# Patient Record
Sex: MALE | Race: BLACK OR AFRICAN AMERICAN
[De-identification: names, ages, dates, MRNs, and addresses within clinical notes are randomized per-mention and may not be internally consistent; named-entity substitution may affect disease eponyms.]

---

## 2017-01-30 ENCOUNTER — HOSPITAL ENCOUNTER (OUTPATIENT)
Dept: HOSPITAL 62 - OD | Age: 82
End: 2017-01-30
Attending: UROLOGY
Payer: MEDICARE

## 2017-01-30 DIAGNOSIS — N40.0: Primary | ICD-10-CM

## 2017-01-30 PROCEDURE — 84153 ASSAY OF PSA TOTAL: CPT

## 2017-01-30 PROCEDURE — 36415 COLL VENOUS BLD VENIPUNCTURE: CPT

## 2018-07-11 ENCOUNTER — HOSPITAL ENCOUNTER (OUTPATIENT)
Dept: HOSPITAL 62 - OD | Age: 83
End: 2018-07-11
Attending: FAMILY MEDICINE
Payer: MEDICARE

## 2018-07-11 DIAGNOSIS — R05: ICD-10-CM

## 2018-07-11 DIAGNOSIS — I77.819: Primary | ICD-10-CM

## 2018-07-11 PROCEDURE — 71046 X-RAY EXAM CHEST 2 VIEWS: CPT

## 2018-07-11 NOTE — RADIOLOGY REPORT (SQ)
EXAM DESCRIPTION:  CHEST PA/LATERAL



COMPLETED DATE/TIME:  7/11/2018 3:48 pm



REASON FOR STUDY:  COUGH



COMPARISON:  4/4/2009



EXAM PARAMETERS:  NUMBER OF VIEWS: two views

TECHNIQUE: Digital Frontal and Lateral radiographic views of the chest acquired.

RADIATION DOSE: NA

LIMITATIONS: none



FINDINGS:  LUNGS AND PLEURA: The lungs are hyperexpanded.  There is no infiltrate or effusion.  There
 is no mass.

MEDIASTINUM AND HILAR STRUCTURES: No masses or contour abnormalities.

HEART AND VASCULAR STRUCTURES: Heart size is normal.  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

BONES: No acute findings.

HARDWARE: None in the chest.

OTHER: No other significant finding.



IMPRESSION:  Chronic lung changes with no acute cardiopulmonary disease.  There is ectasia of the asc
ending aorta.



TECHNICAL DOCUMENTATION:  JOB ID:  [id number]

 2011 [employer]- All Rights Reserved



Reading location - IP/workstation name: [email or web address]

## 2018-08-13 ENCOUNTER — HOSPITAL ENCOUNTER (OUTPATIENT)
Dept: HOSPITAL 62 - RAD | Age: 83
End: 2018-08-13
Attending: UROLOGY
Payer: MEDICARE

## 2018-08-13 DIAGNOSIS — R31.0: ICD-10-CM

## 2018-08-13 DIAGNOSIS — R32: ICD-10-CM

## 2018-08-13 DIAGNOSIS — N40.0: Primary | ICD-10-CM

## 2018-08-13 PROCEDURE — 82565 ASSAY OF CREATININE: CPT

## 2018-08-13 PROCEDURE — 74178 CT ABD&PLV WO CNTR FLWD CNTR: CPT

## 2018-08-13 NOTE — RADIOLOGY REPORT (SQ)
EXAM DESCRIPTION:  CT ABD/PELVIS COMBO



COMPLETED DATE/TIME:  8/13/2018 9:42 am



REASON FOR STUDY:  GROSS HEMATURIA (R31.0), BPH (N40.0), URINARY INCONTINENCE (R32) R31.0  GROSS HEMA
TURIA N40.0  BENIGN PROSTATIC HYPERPLASIA WITHOUT LOWER URINRY TRAC R32  UNSPECIFIED URINARY INCONTIN
ENCE



COMPARISON:  None.



TECHNIQUE:  CT scan of the abdomen and pelvis performed with and without intravenous contrast, and wi
thout oral contrast. Contrasted imaging performed helical scanning technique and dynamic intravenous 
contrast injection. Images reviewed with lung, soft tissue, and bone windows. Reconstructed coronal a
nd sagittal MPR images reviewed. Delayed images for evaluation of the urinary system also acquired. A
ll images stored on PACS.

All CT scanners at this facility use dose modulation, iterative reconstruction, and/or weight based d
osing when appropriate to reduce radiation dose to as low as reasonably achievable (ALARA).

[facility]: Dose Right  [facility]: CareDose    [facility]: Dose Right    [facility]: Teradose 4D    [facility]: Smart Technologies



CONTRAST TYPE AND DOSE:  contrast/concentration: Isovue 300.00 mg/ml; Total Contrast Delivered: 98.0 
ml; Total Saline Delivered: 72.0 ml



RENAL FUNCTION:  Creatinine- 1.7



RADIATION DOSE:  CT Rad equipment meets quality standard of care and radiation dose reduction techniq
ues were employed. CTDIvol: 9.0 - 9.0 mGy. DLP: 1361 mGy-cm. .



LIMITATIONS:  None.



FINDINGS:  LOWER CHEST:  Mild to moderate pericardial effusion.  Mild atelectasis or scar/fibrosis in
 the lower lobes.

LIVER: Normal size. No masses.  No dilated ducts.  The hepatic and portal veins are patent.

SPLEEN: Normal size. No focal lesions.

PANCREAS: No masses. No significant calcifications. No adjacent inflammation or peripancreatic fluid 
collections. Pancreatic duct not dilated.

GALLBLADDER: No identified stones by CT criteria. No inflammatory changes to suggest cholecystitis.

ADRENAL GLANDS: No significant masses or asymmetry.

RIGHT KIDNEY AND URETER:  Very tiny nonobstructing calculus in the cortex of the midpole of the right
 kidney.  No solid masses.   No significant calcifications.   No hydronephrosis or hydroureter.

LEFT KIDNEY AND URETER: No solid masses.   No significant calcifications.   No hydronephrosis or hydr
oureter.

AORTA AND VESSELS:  Atherosclerotic changes involving the abdominal aorta and branch vessels.  No ane
urysm. No dissection. Renal arteries, SMA, celiac without stenosis.

RETROPERITONEUM: No retroperitoneal adenopathy, hemorrhage or masses.

BOWEL AND PERITONEAL CAVITY:  Post surgical changes with anastomotic sutures in the mid sigmoid colon
.  Constipation.  Colonic diverticulae without evidence of diverticulitis.  No free fluid .

APPENDIX:  Prior appendectomy.

PELVIS:  The prostate gland is enlarged and measures 5.4 cm in diameter.  There is indentation on the
 base of the urinary bladder may be related to median lobe hypertrophy of the prostate gland.  Questi
on of a 2.5 cm soft tissue mass along the left lateral wall of the urinary bladder raising the questi
on of neoplasm, axial images 69--70, series 3. Mild diffuse thickening of the urinary bladder wall.  
No free fluid.

ABDOMINAL WALL:  Prior right inguinal hernia repair.  Recurrent right inguinal hernia contains a port
ion of the urinary bladder and fat.  Left inguinal hernia contains a loop of non-dilated small bowel,
 fat, and small mesenteric vessels.

BONES:  Degenerative changes L4-L5.

OTHER: No other significant finding.



IMPRESSION:  1 Prostate gland enlargement.  There is indentation on the base of the urinary bladder m
ay be related to median lobe hypertrophy of the prostate gland.  Question of a soft tissue mass along
 the left lateral wall of the urinary bladder raising the question of neoplasm.  Mild diffuse thicken
ing of the urinary bladder wall.  Correlation is definitely suggested.

2.  Prior right inguinal hernia repair.  Recurrent right inguinal hernia contains a portion of the ur
inary bladder and fat.  Left inguinal hernia contains fat, non-dilated small bowel and small mesenter
ic vessels.

3 Additional findings as above.

4.  Mild to moderate pericardial effusion.  Correlation suggested.



TECHNICAL DOCUMENTATION:  JOB ID:  [id number]

Quality ID # 436: Final reports with documentation of one or more dose reduction techniques (e.g., Au
tomated exposure control, adjustment of the mA and/or kV according to patient size, use of iterative 
reconstruction technique)

 2011 [employer]- All Rights Reserved



Reading location - IP/workstation name: [email or web address]

## 2018-08-31 ENCOUNTER — HOSPITAL ENCOUNTER (OUTPATIENT)
Dept: HOSPITAL 62 - RAD | Age: 83
End: 2018-08-31
Attending: FAMILY MEDICINE
Payer: MEDICARE

## 2018-08-31 DIAGNOSIS — R05: ICD-10-CM

## 2018-08-31 DIAGNOSIS — J44.9: Primary | ICD-10-CM

## 2018-08-31 PROCEDURE — 71250 CT THORAX DX C-: CPT

## 2018-08-31 NOTE — RADIOLOGY REPORT (SQ)
EXAM DESCRIPTION:  CT CHEST WITHOUT



COMPLETED DATE/TIME:  8/31/2018 2:04 pm



REASON FOR STUDY:  COPD (J44.9), COUGH (R05) J44.9  CHRONIC OBSTRUCTIVE PULMONARY DISEASE, UNSPECIFIE
D R05  COUGH



COMPARISON:  Chest radiograph 7/11/2018



TECHNIQUE:  CT scan performed of the chest without intravenous contrast.  Images reviewed with lung, 
soft tissue and bone windows.  Reconstructed coronal and sagittal MPR images reviewed.  All images st
ored on PACS.

All CT scanners at this facility use dose modulation, iterative reconstruction, and/or weight based d
osing when appropriate to reduce radiation dose to as low as reasonably achievable (ALARA).

[facility]: Dose Right  [facility]: CareDose    [facility]: Dose Right    [facility]: Teradose 4D    [facility]: Smart Technologies



RADIATION DOSE:  CT Rad equipment meets quality standard of care and radiation dose reduction techniq
ues were employed. CTDIvol: 6.0 mGy. DLP: 241 mGy-cm. mGy.



LIMITATIONS:  No technical limitations.



FINDINGS:  LUNGS AND PLEURA: No masses, infiltrates, or pneumothorax.  No pleural effusions or pleura
l calcifications.

HILAR AND MEDIASTINAL STRUCTURES: Upper mediastinal mass versus adenopathy.  5 cm AP diameter image 3
0.  Smaller upper mediastinal nodes.

HEART AND VASCULAR STRUCTURES: No aneurysm.  There is a pericardial effusion.

UPPER ABDOMEN: No significant findings.  Limited exam.

THYROID AND OTHER SOFT TISSUES: No masses.  No adenopathy.

BONES: No significant finding.

HARDWARE: None in the chest.

OTHER: No other significant findings.



IMPRESSION:  Upper mediastinal mass/adenopathy.  Pericardial effusion.



TECHNICAL DOCUMENTATION:  JOB ID:  [id number]

Quality ID # 436: Final reports with documentation of one or more dose reduction techniques (e.g., Au
tomated exposure control, adjustment of the mA and/or kV according to patient size, use of iterative 
reconstruction technique)

 2011 [employer]- All Rights Reserved



Reading location - IP/workstation name: [email or web address]

## 2018-09-25 ENCOUNTER — HOSPITAL ENCOUNTER (OUTPATIENT)
Dept: HOSPITAL 62 - RAD | Age: 83
End: 2018-09-25
Attending: INTERNAL MEDICINE
Payer: MEDICARE

## 2018-09-25 DIAGNOSIS — C38.1: Primary | ICD-10-CM

## 2018-09-25 PROCEDURE — A9552 F18 FDG: HCPCS

## 2018-09-25 PROCEDURE — 78815 PET IMAGE W/CT SKULL-THIGH: CPT

## 2018-09-26 NOTE — RADIOLOGY REPORT (SQ)
EXAM DESCRIPTION:  PET CT SKULL/THIGH



COMPLETED DATE/TIME:  9/25/2018 8:45 pm



REASON FOR STUDY:  MALIGNANT NEOPLASM OF ANTERIOR MEDIASTINUM C38.1  MALIGNANT NEOPLASM OF ANTERIOR M
EDIASTINUM



COMPARISON:  CT chest abdomen pelvis 8/31/2018



RADIONUCLIDE AND DOSE:  10.5 mCi F18 FDG

The route of agent administration: Intravenous



FASTING BLOOD SUGAR:  84 mg/dl



CONTRAST TYPE AND DOSE:  No CT contrast given.



TECHNIQUE:  Blood glucose level was verified.  Above dose of FDG was injected intravenously.  2-D seg
mented attenuation correction images were obtained from the base of the skull to the midthighs.  Nonc
ontrast CT images were obtained for attenuation correction and fusion with emission images.  CT image
s were performed without oral or intravenous contrast and are not sensitive for parenchymal lesions. 
 A series of overlapping emission PET images were obtained.  Images reviewed and manipulated at [facility] work station by the radiologist.  Images stored on PACS.



LIMITATIONS:  None.



FINDINGS:  HEAD AND NECK: No areas of abnormal metabolic activity in the soft tissues of the head and
 neck.

CHEST: A 5.7 x 4.8 cm mass is present in the precarinal region on axial image 89 with SUV 5.4.

Mediastinal adenopathy is present as follows:

Right thoracic inlet 2.4 x 1.7 cm lymph node image 62, SUV 3.4

Pretracheal 2.7 x 2.3 cm lymph node image 76, SUV 2.8

Aortopulmonary window 1.8 x 1.6 cm lymph node image 75, SUV 2.6

ABDOMEN AND PELVIS: No areas of abnormal metabolic activity in the abdomen or pelvis.  Expected physi
ologic activity is present in the genitourinary system and bowel.

PROXIMAL LOWER EXTREMITIES: No areas of abnormal metabolic activity in the soft tissues of the lower 
extremities.

BONES: No abnormal metabolic activity in the visualized skeleton.

ADDITIONAL CT FINDINGS: Trace pericardial effusion, right inguinal hernia containing a portion of the
 right lateral bladder.  Left inguinal hernia with nonobstructed sigmoid colon.  Clips post partial s
igmoid colectomy.  Prostate enlarged.

OTHER: Liver background activity 2.3 SUV.  Blood pool background activity 1.9 SUV



IMPRESSION:  Malignant mediastinal adenopathy as above



TECHNICAL DOCUMENTATION:  JOB ID:  [id number]

 [employer] All Rights Reserved



Reading location - IP/workstation name: [facility]

## 2019-02-13 ENCOUNTER — HOSPITAL ENCOUNTER (OUTPATIENT)
Dept: HOSPITAL 62 - RAD | Age: 84
End: 2019-02-13
Attending: INTERNAL MEDICINE
Payer: MEDICARE

## 2019-02-13 DIAGNOSIS — C7A.090: Primary | ICD-10-CM

## 2019-02-13 PROCEDURE — 71250 CT THORAX DX C-: CPT

## 2019-02-13 NOTE — RADIOLOGY REPORT (SQ)
EXAM DESCRIPTION:  CT CHEST WITHOUT



COMPLETED DATE/TIME:  2/13/2019 9:05 am



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
INOID TUMOR OF THE BRONCHUS AND LUNG



COMPARISON:  8/31/2018



TECHNIQUE:  CT scan performed of the chest without intravenous contrast.  Images reviewed with lung, 
soft tissue and bone windows.  Reconstructed coronal and sagittal MPR images reviewed.  All images st
ored on PACS.

All CT scanners at this facility use dose modulation, iterative reconstruction, and/or weight based d
osing when appropriate to reduce radiation dose to as low as reasonably achievable (ALARA).

[facility]: Dose Right  [facility]: CareDose    [facility]: Dose Right    [facility]: Teradose 4D    [facility]: Smart Technologies



RADIATION DOSE:  CT Rad equipment meets quality standard of care and radiation dose reduction techniq
ues were employed. CTDIvol: 6.9 mGy. DLP: 282 mGy-cm. mGy.



LIMITATIONS:  No technical limitations.



FINDINGS:  LUNGS AND PLEURA: Mild stable bilateral emphysematous changes with subpleural blebs in the
 apices.  No consolidation or effusions.  No suspicious nodules.

HILAR AND MEDIASTINAL STRUCTURES: There is persistent bulky upper mediastinal adenopathy.  The larges
t soft tissue mass is pretracheal in location adjacent to the ascending aorta.  This measures 6.0 x 4
.9 cm.  It is slightly larger than noted on prior study.

HEART AND VASCULAR STRUCTURES: Small pericardial effusion is again noted.

UPPER ABDOMEN: No significant findings.  Limited exam.

THYROID AND OTHER SOFT TISSUES: No masses.  No adenopathy.

BONES: No significant finding.

HARDWARE: None in the chest.

OTHER: No other significant findings.



IMPRESSION:  1. Bulky upper mediastinal adenopathy and pericardial effusion.  The largest soft tissue
 mass has slightly increased in size and now measures 6.0 x 4.9 cm in size.



TECHNICAL DOCUMENTATION:  JOB ID:  [id number]

Quality ID # 436: Final reports with documentation of one or more dose reduction techniques (e.g., Au
tomated exposure control, adjustment of the mA and/or kV according to patient size, use of iterative 
reconstruction technique)

 2011 [employer]- All Rights Reserved



Reading location - IP/workstation name: [email or web address]

## 2019-04-16 ENCOUNTER — HOSPITAL ENCOUNTER (OUTPATIENT)
Dept: HOSPITAL 62 - RAD | Age: 84
End: 2019-04-16
Attending: OTOLARYNGOLOGY
Payer: MEDICARE

## 2019-04-16 DIAGNOSIS — R59.0: Primary | ICD-10-CM

## 2019-04-16 PROCEDURE — 70490 CT SOFT TISSUE NECK W/O DYE: CPT

## 2019-04-16 PROCEDURE — 82565 ASSAY OF CREATININE: CPT

## 2019-04-16 NOTE — RADIOLOGY REPORT (SQ)
EXAM DESCRIPTION:  CT SOFT TISSUE NECK WITHOUT



COMPLETED DATE/TIME:  4/16/2019 9:49 am



REASON FOR STUDY:  CERVICAL LYMPHADENOPATHY R59.0  LOCALIZED ENLARGED LYMPH NODES



COMPARISON:  CT chest dated 2/13/2019.



TECHNIQUE:  Noncontrast scanning from skull base through lung apices with review of bone, soft tissue
 and lung windows.  Reconstructed coronal and sagittal MPR images reviewed.  All images stored on PAC
S.

All CT scanners at this facility use dose modulation, iterative reconstruction, and/or weight based d
osing when appropriate to reduce radiation dose to as low as reasonably achievable (ALARA).

[facility]: Dose Right  [facility]: CareDose    [facility]: Dose Right    [facility]: Teradose 4D    [facility]: Smart Technologies



RADIATION DOSE:   mGy.



LIMITATIONS:  None.



FINDINGS:  SKULL BASE: Intact.

MAJOR SALIVARY GLANDS: No solid or cystic masses.  No inflammatory changes.

LYMPHADENOPATHY: Extensive adenopathy in the upper mediastinum again seen.  There are numerous large 
lymph nodes in the paratracheal region at the level of the aortic arch and origin of the great vessel
s as well as enlarged right supraclavicular lymph nodes.  The largest of the right supraclavicular ly
mph nodes measures 3.6 cm.  There are a few slightly enlarged lymph nodes in the left supraclavicular
 region measuring up to 1.4 cm.  Overall the lymph nodes are unchanged in size and appearance.  No ne
w adenopathy.  No significant cervical adenopathy.

MUCOSAL MASSES OR ASYMMETRY: No mucosal masses or asymmetry.

LARYNX/CORDS: No abnormal findings.

LUNG APICES: Clear.  8 emphysematous changes with small bullae.

BONES: Intact.  Degenerative changes in the spine.

THYROID: Normal size.  No masses.

PARANASAL SINUSES: Clear.

OTHER: No other significant finding.



IMPRESSION:  EXTENSIVE ADENOPATHY IN THE UPPER MEDIASTINUM.  SUPRACLAVICULAR ADENOPATHY, RIGHT GREATE
R THAN LEFT.  COMPARED TO THE PREVIOUS CT CHEST THESE LYMPH NODES APPEAR UNCHANGED IN SIZE AND APPEAR
ANCE.  NO NEW LYMPH NODES EVIDENT.  NO SIGNIFICANT CERVICAL ADENOPATHY.



TECHNICAL DOCUMENTATION:  JOB ID:  [id number]

Quality ID # 436: Final reports with documentation of one or more dose reduction techniques (e.g., Au
tomated exposure control, adjustment of the mA and/or kV according to patient size, use of iterative 
reconstruction technique)

 2011 [employer]- All Rights Reserved



Reading location - IP/workstation name: [email or web address]

## 2019-04-30 ENCOUNTER — HOSPITAL ENCOUNTER (OUTPATIENT)
Dept: HOSPITAL 62 - RAD | Age: 84
End: 2019-04-30
Attending: OTOLARYNGOLOGY
Payer: MEDICARE

## 2019-04-30 DIAGNOSIS — C7B.8: Primary | ICD-10-CM

## 2019-04-30 PROCEDURE — 88341 IMHCHEM/IMCYTCHM EA ADD ANTB: CPT

## 2019-04-30 PROCEDURE — 88313 SPECIAL STAINS GROUP 2: CPT

## 2019-04-30 PROCEDURE — 88342 IMHCHEM/IMCYTCHM 1ST ANTB: CPT

## 2019-04-30 PROCEDURE — 88305 TISSUE EXAM BY PATHOLOGIST: CPT

## 2019-04-30 PROCEDURE — 38505 NEEDLE BIOPSY LYMPH NODES: CPT

## 2019-04-30 NOTE — RADIOLOGY REPORT (SQ)
EXAM DESCRIPTION:  U/S BIOPSY SUPERFIC LYMPH NODE



COMPLETED DATE/TIME:  4/30/2019 2:49 pm



REASON FOR STUDY:  CERVICAL LYMPHADENOPATHY (R59.0) R59.0  LOCALIZED ENLARGED LYMPH NODES



COMPARISON:   None.



TECHNIQUE:  The procedure was discussed with the patient and written informed consent obtained.  A ti
meout was performed to confirm the procedure and patient's identity.  The skin of the neck was preppe
d and draped in sterile fashion and 2 cc 1% lidocaine

administered for local anesthesia.  Under sonographic guidance, 14 cage core biopsy was performed of 
the right supraclavicular left node.

2 cores were obtained.  Pathology was present to verify adequate tissue sampling.  Hemostasis was obt
ained with direct manual compression.  There were no immediate complications.



LIMITATIONS:  None.



FINDINGS:  PATHOLOGY: Pending.



IMPRESSION:  ULTRASOUND-GUIDED BIOPSY RIGHT SUPRACLAVICULAR LYMPH NODE.  PATHOLOGY PENDING AT THE LACHELLE
E OF DICTATION.



COMMENT:  Patient medication list reviewed: Yes- Quality ID# 130:Eligible professional attests to doc
umenting in the medical record they obtained, updated, or reviewed the patient's current medications.




TECHNICAL DOCUMENTATION:  JOB ID:  [id number]

 2011 [employer]- All Rights Reserved



Reading location - IP/workstation name: [email or web address]

## 2019-09-06 ENCOUNTER — HOSPITAL ENCOUNTER (INPATIENT)
Dept: HOSPITAL 62 - ER | Age: 84
LOS: 3 days | Discharge: HOSPICE | DRG: 844 | End: 2019-09-09
Attending: FAMILY MEDICINE | Admitting: FAMILY MEDICINE
Payer: MEDICARE

## 2019-09-06 DIAGNOSIS — C7B.09: Primary | ICD-10-CM

## 2019-09-06 DIAGNOSIS — C7A.090: ICD-10-CM

## 2019-09-06 DIAGNOSIS — K21.9: ICD-10-CM

## 2019-09-06 DIAGNOSIS — Z88.8: ICD-10-CM

## 2019-09-06 DIAGNOSIS — Z87.891: ICD-10-CM

## 2019-09-06 DIAGNOSIS — I10: ICD-10-CM

## 2019-09-06 DIAGNOSIS — E78.5: ICD-10-CM

## 2019-09-06 DIAGNOSIS — Z66: ICD-10-CM

## 2019-09-06 DIAGNOSIS — Z79.899: ICD-10-CM

## 2019-09-06 LAB
ADD MANUAL DIFF: NO
ADD MANUAL DIFF: NO
ALBUMIN SERPL-MCNC: 4.1 G/DL (ref 3.5–5)
ALP SERPL-CCNC: 63 U/L (ref 38–126)
ANION GAP SERPL CALC-SCNC: 9 MMOL/L (ref 5–19)
APPEARANCE UR: (no result)
APTT PPP: (no result) S
AST SERPL-CCNC: 53 U/L (ref 17–59)
BASOPHILS # BLD AUTO: 0 10^3/UL (ref 0–0.2)
BASOPHILS # BLD AUTO: 0.1 10^3/UL (ref 0–0.2)
BASOPHILS NFR BLD AUTO: 0.1 % (ref 0–2)
BASOPHILS NFR BLD AUTO: 0.7 % (ref 0–2)
BILIRUB DIRECT SERPL-MCNC: 0.5 MG/DL (ref 0–0.4)
BILIRUB SERPL-MCNC: 0.9 MG/DL (ref 0.2–1.3)
BILIRUB UR QL STRIP: (no result)
BUN SERPL-MCNC: 16 MG/DL (ref 7–20)
CALCIUM: 10.1 MG/DL (ref 8.4–10.2)
CHLORIDE SERPL-SCNC: 101 MMOL/L (ref 98–107)
CK MB SERPL-MCNC: 4.19 NG/ML (ref ?–4.55)
CK SERPL-CCNC: 331 U/L (ref 55–170)
CO2 SERPL-SCNC: 25 MMOL/L (ref 22–30)
EOSINOPHIL # BLD AUTO: 0 10^3/UL (ref 0–0.6)
EOSINOPHIL # BLD AUTO: 0.1 10^3/UL (ref 0–0.6)
EOSINOPHIL NFR BLD AUTO: 0.1 % (ref 0–6)
EOSINOPHIL NFR BLD AUTO: 1 % (ref 0–6)
ERYTHROCYTE [DISTWIDTH] IN BLOOD BY AUTOMATED COUNT: 14.4 % (ref 11.5–14)
ERYTHROCYTE [DISTWIDTH] IN BLOOD BY AUTOMATED COUNT: 14.6 % (ref 11.5–14)
GLUCOSE SERPL-MCNC: 113 MG/DL (ref 75–110)
GLUCOSE UR STRIP-MCNC: NEGATIVE MG/DL
HCT VFR BLD CALC: 30.9 % (ref 37.9–51)
HCT VFR BLD CALC: 36.5 % (ref 37.9–51)
HGB BLD-MCNC: 10.6 G/DL (ref 13.5–17)
HGB BLD-MCNC: 12.3 G/DL (ref 13.5–17)
INR PPP: 1.1
KETONES UR STRIP-MCNC: 20 MG/DL
LYMPHOCYTES # BLD AUTO: 0.8 10^3/UL (ref 0.5–4.7)
LYMPHOCYTES # BLD AUTO: 1.8 10^3/UL (ref 0.5–4.7)
LYMPHOCYTES NFR BLD AUTO: 17.7 % (ref 13–45)
LYMPHOCYTES NFR BLD AUTO: 9.8 % (ref 13–45)
MCH RBC QN AUTO: 30.5 PG (ref 27–33.4)
MCH RBC QN AUTO: 30.8 PG (ref 27–33.4)
MCHC RBC AUTO-ENTMCNC: 33.8 G/DL (ref 32–36)
MCHC RBC AUTO-ENTMCNC: 34.2 G/DL (ref 32–36)
MCV RBC AUTO: 90 FL (ref 80–97)
MCV RBC AUTO: 90 FL (ref 80–97)
MONOCYTES # BLD AUTO: 0.2 10^3/UL (ref 0.1–1.4)
MONOCYTES # BLD AUTO: 0.9 10^3/UL (ref 0.1–1.4)
MONOCYTES NFR BLD AUTO: 2.4 % (ref 3–13)
MONOCYTES NFR BLD AUTO: 9 % (ref 3–13)
NEUTROPHILS # BLD AUTO: 6.9 10^3/UL (ref 1.7–8.2)
NEUTROPHILS # BLD AUTO: 7.2 10^3/UL (ref 1.7–8.2)
NEUTS SEG NFR BLD AUTO: 71.6 % (ref 42–78)
NEUTS SEG NFR BLD AUTO: 87.6 % (ref 42–78)
NITRITE UR QL STRIP: NEGATIVE
PH UR STRIP: 5 [PH] (ref 5–9)
PLATELET # BLD: 211 10^3/UL (ref 150–450)
PLATELET # BLD: 229 10^3/UL (ref 150–450)
POTASSIUM SERPL-SCNC: 4.7 MMOL/L (ref 3.6–5)
PROT SERPL-MCNC: 8.3 G/DL (ref 6.3–8.2)
PROT UR STRIP-MCNC: 100 MG/DL
PROTHROMBIN TIME: 14.3 SEC (ref 11.4–15.4)
RBC # BLD AUTO: 3.43 10^6/UL (ref 4.35–5.55)
RBC # BLD AUTO: 4.04 10^6/UL (ref 4.35–5.55)
SP GR UR STRIP: 1.02
TOTAL CELLS COUNTED % (AUTO): 100 %
TOTAL CELLS COUNTED % (AUTO): 100 %
TROPONIN I SERPL-MCNC: 0.03 NG/ML
UROBILINOGEN UR-MCNC: 4 MG/DL (ref ?–2)
WBC # BLD AUTO: 10 10^3/UL (ref 4–10.5)
WBC # BLD AUTO: 7.9 10^3/UL (ref 4–10.5)

## 2019-09-06 PROCEDURE — 93010 ELECTROCARDIOGRAM REPORT: CPT

## 2019-09-06 PROCEDURE — 85610 PROTHROMBIN TIME: CPT

## 2019-09-06 PROCEDURE — 71045 X-RAY EXAM CHEST 1 VIEW: CPT

## 2019-09-06 PROCEDURE — 83735 ASSAY OF MAGNESIUM: CPT

## 2019-09-06 PROCEDURE — 96374 THER/PROPH/DIAG INJ IV PUSH: CPT

## 2019-09-06 PROCEDURE — 93005 ELECTROCARDIOGRAM TRACING: CPT

## 2019-09-06 PROCEDURE — 81001 URINALYSIS AUTO W/SCOPE: CPT

## 2019-09-06 PROCEDURE — 82550 ASSAY OF CK (CPK): CPT

## 2019-09-06 PROCEDURE — 36415 COLL VENOUS BLD VENIPUNCTURE: CPT

## 2019-09-06 PROCEDURE — 70450 CT HEAD/BRAIN W/O DYE: CPT

## 2019-09-06 PROCEDURE — 85025 COMPLETE CBC W/AUTO DIFF WBC: CPT

## 2019-09-06 PROCEDURE — 82962 GLUCOSE BLOOD TEST: CPT

## 2019-09-06 PROCEDURE — 80053 COMPREHEN METABOLIC PANEL: CPT

## 2019-09-06 PROCEDURE — 80048 BASIC METABOLIC PNL TOTAL CA: CPT

## 2019-09-06 PROCEDURE — 84484 ASSAY OF TROPONIN QUANT: CPT

## 2019-09-06 PROCEDURE — 99291 CRITICAL CARE FIRST HOUR: CPT

## 2019-09-06 PROCEDURE — 82553 CREATINE MB FRACTION: CPT

## 2019-09-06 RX ADMIN — DEXAMETHASONE SODIUM PHOSPHATE SCH MG: 4 INJECTION, SOLUTION INTRAMUSCULAR; INTRAVENOUS at 21:55

## 2019-09-06 RX ADMIN — FAMOTIDINE SCH: 20 TABLET, FILM COATED ORAL at 21:56

## 2019-09-06 RX ADMIN — INSULIN LISPRO SCH: 100 INJECTION, SOLUTION INTRAVENOUS; SUBCUTANEOUS at 21:43

## 2019-09-06 RX ADMIN — LEVETIRACETAM SCH MLS/HR: 5 INJECTION INTRAVENOUS at 21:55

## 2019-09-06 NOTE — RADIOLOGY REPORT (SQ)
EXAM DESCRIPTION:  CHEST SINGLE VIEW



COMPLETED DATE/TIME:  9/6/2019 3:15 pm



REASON FOR STUDY:  New onset weakness



COMPARISON:  4/4/2009



NUMBER OF VIEWS:  One view.



TECHNIQUE:  Single frontal radiographic view of the chest acquired.



LIMITATIONS:  None.



FINDINGS:  LUNGS AND PLEURA: No opacities, masses or pneumothorax. No pleural effusion.

MEDIASTINUM AND HILAR STRUCTURES: There is right hilar and upper mediastinal adenopathy.

HEART AND VASCULAR STRUCTURES: Heart normal in size.  Normal vasculature.

BONES: No acute findings.

HARDWARE: None in the chest.

OTHER: No other significant finding.



IMPRESSION:  Right hilar and upper mediastinal adenopathy.



TECHNICAL DOCUMENTATION:  JOB ID:  [id number]

 2011 [employer]- All Rights Reserved



Reading location - IP/workstation name: [email or web address]

## 2019-09-06 NOTE — PROGRESS NOTE
Provider Note


Provider Note: 





pt seen and exam in er


d/w wife and family


pt id dnr/dni


plan for hospic care

## 2019-09-06 NOTE — RADIOLOGY REPORT (SQ)
EXAM DESCRIPTION:  CT HEAD WITHOUT



COMPLETED DATE/TIME:  9/6/2019 3:09 pm



REASON FOR STUDY:  New onset weakness



COMPARISON:  None.



TECHNIQUE:  Axial images acquired through the brain without intravenous contrast.  Images reviewed wi
th bone, brain and subdural windows.  Additional sagittal and coronal reconstructions were generated.
 Images stored on PACS.

All CT scanners at this facility use dose modulation, iterative reconstruction, and/or weight based d
osing when appropriate to reduce radiation dose to as low as reasonably achievable (ALARA).

[facility]: Dose Right  [facility]: CareDose    [facility]: Dose Right    [facility]: Teradose 4D    [facility]: Smart [employer]



RADIATION DOSE:  CT Rad equipment meets quality standard of care and radiation dose reduction techniq
ues were employed. CTDIvol: 53.2 mGy. DLP: 1044 mGy-cm.mGy.



LIMITATIONS:  None.



FINDINGS:  VENTRICLES: Prominent.

CEREBRUM: There is an area of increased attenuation the left parietal lobe.  There is surrounding vas
ogenic edema this is suspicious for hemorrhagic metastatic disease.  This measures 1.9 x 1.6 cm in si
ze.  There is diffuse decreased attenuation throughout the periventricular white matter consistent wi
th small vessel disease.

CEREBELLUM: There is a hyperattenuating lesion in the left aspect of the cerebellum.  This measures 2
.5 x 2.2 cm.  Again metastatic lesion is thought to be most likely.  There is surrounding vasogenic e
celeste.  Some mass effect on the 3rd ventricle.

EXTRAAXIAL SPACES: Age-related involutional change.  No fluid collections.  No masses.

ORBITS AND GLOBE: No intra- or extraconal masses.  Normal contour of globe without masses.

CALVARIUM: No fracture.

PARANASAL SINUSES: No fluid or mucosal thickening.

SOFT TISSUES: No mass or hematoma.

OTHER: No other significant finding.



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

Diffuse cerebral atrophy and small-vessel ischemic changes.

EVIDENCE OF ACUTE STROKE: NO.



COMMENT:   Pertinent findings on the imaging study reported as a CRITICAL RESULT to VALENTINA STOREY MD
 at15:17 on 9/6/2019.

Category of Critical Result: Intracranial hemorrhage



TECHNICAL DOCUMENTATION:  JOB ID:  [id number]

Quality ID # 436: Final reports with documentation of one or more dose reduction techniques (e.g., Au
tomated exposure control, adjustment of the mA and/or kV according to patient size, use of iterative 
reconstruction technique)

 2011 [employer]- All Rights Reserved



Reading location - IP/workstation name: [email or web address]

## 2019-09-06 NOTE — ER DOCUMENT REPORT
Entered by PACO MACE SCRIBE  09/06/19 1445 





Acting as scribe for:VALENTINA STOREY MD





ED General





- General


Chief Complaint: General Weakness


Stated Complaint: WEAKNESS


Time Seen by Provider: 09/06/19 14:28


Primary Care Provider: 


NORBERTO MORELAND MD [ACTIVE STAFF] - Follow up as needed


Mode of Arrival: Ambulatory


Information source: Patient


Notes: 





Patient is an 88 year old male that presents to the emergency department today 

with complaints of generalized weakness for the last week. Wife at bedside 

states that they recently went on vacation to [place], and on 8/31 the patient 

stated he wasn't feeling good, that he was just going to stay at home. Wife 

reports the patient slept all day, went up to go to the bathroom and on the way 

back fell onto the couch. Wife states the patient has been able to walk since 

then due to weakness. Patient has known lung cancer which he elected not to 

treat.


TRAVEL OUTSIDE OF THE [place] IN LAST 30 DAYS: No





- Related Data


Allergies/Adverse Reactions: 


                                        





simvastatin [From Zocor] Allergy (Verified 09/06/19 15:40)


   











Past Medical History





- General


Information source: Patient





- Social History


Smoking Status: Former Smoker


Cigarette use (# per day): No


Chew tobacco use (# tins/day): No


Smoking Education Provided: No


Frequency of alcohol use: None


Drug Abuse: None


Lives with: Family


Family History: Reviewed & Not Pertinent





Review of Systems





- Review of Systems


-: Yes ROS unobtainable due to patient's medical condition - given by family at 

bedside


Constitutional: See HPI, Weakness - generalized


EENT: No symptoms reported


Cardiovascular: No symptoms reported


Respiratory: No symptoms reported


Gastrointestinal: No symptoms reported


Genitourinary: No symptoms reported


Male Genitourinary: No symptoms reported


Musculoskeletal: No symptoms reported


Skin: No symptoms reported


Hematologic/Lymphatic: No symptoms reported


Neurological/Psychological: No symptoms reported


-: Yes All other systems reviewed and negative





Physical Exam





- Vital signs


Vitals: 


                                        











Pulse Ox


 


 96 


 


 09/06/19 13:43














- Notes


Notes: 





Physical Exam:


 


General: Patient smiles to name, but stares off into space without answering 

questions.


 


HEENT: Normocephalic. Atraumatic. PERRL. Extraocular movements intact. 

Oropharynx clear. Hearing aid on the left. Symmetrical smile. Dry mucous 

membranes. Tongue is coated.


 


Neck: Supple. Non-tender.


 


Respiratory: No respiratory distress. Clear and equal breath sounds bilaterally.


 


Cardiovascular: Regular rate and rhythm. 


 


Abdominal: Normal Inspection. Non-tender. No distension. Normal Bowel Sounds. 


 


Back: No gross abnormalities. 


 


Extremities:


Upper extremities: RUE and right  strength is normal. LUE and left  

strength are weak.


Lower extremities: RLE weakness, unable to hold leg off the bed. LLE strength is

normal. 


 


Neurological: No facial droop. Tongue protrudes midline. 


 


Skin: Warm. Dry. Normal color.





Course





- Vital Signs


Vital signs: 


                                        











Temp Pulse Resp BP Pulse Ox


 


 98.5 F      23 H  144/85 H  95 


 


 09/06/19 13:44     09/06/19 14:01  09/06/19 14:00  09/06/19 14:01














- Laboratory


Result Diagrams: 


                                 09/06/19 13:50





                                 09/06/19 13:50


Laboratory results interpreted by me: 


                                        











  09/06/19





  13:50


 


RBC  4.04 L


 


Hgb  12.3 L


 


Hct  36.5 L


 


RDW  14.6 H














- Diagnostic Test


Radiology reviewed: Image reviewed, Reports reviewed - CT scan of the head shows

2 hemorrhagic metastases, one at the cerebellar peduncle on the left, and one in

the left parietal region.





- Consults


  ** Dr. Damon


Time consulted: 15:30


Consulted provider: will see as inpatient





Critical Care Note





- Critical Care Note


Total time excluding time spent on procedures (mins): 35





Discharge





- Discharge


Clinical Impression: 


 Metastasis to brain, Weakness, Small cell lung cancer





Carcinoid tumor


Qualifiers:


 Carcinoid tumor malignancy status: unspecified whether malignant Carcinoid 

tumor location: unspecified site Qualified Code(s): D3A.00 - Benign carcinoid 

tumor of unspecified site





Condition: Stable


Disposition: ADMITTED AS INPATIENT


Admitting Provider: Nelson


Unit Admitted: IMCU


Referrals: 


MARK DAMON MD [Primary Care Provider] - Follow up as needed


Scribe Attestation: 





09/06/19 15:11


I personally performed the services described in the documentation, reviewed and

edited the documentation which was dictated to the scribe in my presence, and it

accurately records my words and actions.





I personally performed the services described in the documentation, reviewed and

edited the documentation which was dictated to the scribe in my presence, and it

accurately records my words and actions.

## 2019-09-07 LAB
ADD MANUAL DIFF: NO
ANION GAP SERPL CALC-SCNC: 13 MMOL/L (ref 5–19)
BASOPHILS # BLD AUTO: 0.1 10^3/UL (ref 0–0.2)
BASOPHILS NFR BLD AUTO: 0.6 % (ref 0–2)
BUN SERPL-MCNC: 22 MG/DL (ref 7–20)
CALCIUM: 10.2 MG/DL (ref 8.4–10.2)
CHLORIDE SERPL-SCNC: 101 MMOL/L (ref 98–107)
CO2 SERPL-SCNC: 23 MMOL/L (ref 22–30)
EOSINOPHIL # BLD AUTO: 0 10^3/UL (ref 0–0.6)
EOSINOPHIL NFR BLD AUTO: 0 % (ref 0–6)
ERYTHROCYTE [DISTWIDTH] IN BLOOD BY AUTOMATED COUNT: 14.6 % (ref 11.5–14)
GLUCOSE SERPL-MCNC: 126 MG/DL (ref 75–110)
HCT VFR BLD CALC: 32.8 % (ref 37.9–51)
HGB BLD-MCNC: 11.3 G/DL (ref 13.5–17)
LYMPHOCYTES # BLD AUTO: 1.4 10^3/UL (ref 0.5–4.7)
LYMPHOCYTES NFR BLD AUTO: 18 % (ref 13–45)
MCH RBC QN AUTO: 31.2 PG (ref 27–33.4)
MCHC RBC AUTO-ENTMCNC: 34.4 G/DL (ref 32–36)
MCV RBC AUTO: 91 FL (ref 80–97)
MONOCYTES # BLD AUTO: 0.3 10^3/UL (ref 0.1–1.4)
MONOCYTES NFR BLD AUTO: 3.7 % (ref 3–13)
NEUTROPHILS # BLD AUTO: 6.2 10^3/UL (ref 1.7–8.2)
NEUTS SEG NFR BLD AUTO: 77.7 % (ref 42–78)
PLATELET # BLD: 213 10^3/UL (ref 150–450)
POTASSIUM SERPL-SCNC: 4.9 MMOL/L (ref 3.6–5)
RBC # BLD AUTO: 3.61 10^6/UL (ref 4.35–5.55)
TOTAL CELLS COUNTED % (AUTO): 100 %
WBC # BLD AUTO: 8 10^3/UL (ref 4–10.5)

## 2019-09-07 RX ADMIN — LEVETIRACETAM SCH MLS/HR: 5 INJECTION INTRAVENOUS at 09:09

## 2019-09-07 RX ADMIN — DEXAMETHASONE SODIUM PHOSPHATE SCH MG: 4 INJECTION, SOLUTION INTRAMUSCULAR; INTRAVENOUS at 16:39

## 2019-09-07 RX ADMIN — FAMOTIDINE SCH: 20 TABLET, FILM COATED ORAL at 09:16

## 2019-09-07 RX ADMIN — DEXAMETHASONE SODIUM PHOSPHATE SCH MG: 4 INJECTION, SOLUTION INTRAMUSCULAR; INTRAVENOUS at 09:09

## 2019-09-07 RX ADMIN — FAMOTIDINE SCH: 20 TABLET, FILM COATED ORAL at 22:09

## 2019-09-07 RX ADMIN — INSULIN LISPRO SCH: 100 INJECTION, SOLUTION INTRAVENOUS; SUBCUTANEOUS at 15:48

## 2019-09-07 RX ADMIN — INSULIN LISPRO SCH: 100 INJECTION, SOLUTION INTRAVENOUS; SUBCUTANEOUS at 22:09

## 2019-09-07 RX ADMIN — INSULIN LISPRO SCH: 100 INJECTION, SOLUTION INTRAVENOUS; SUBCUTANEOUS at 09:15

## 2019-09-07 RX ADMIN — DEXAMETHASONE SODIUM PHOSPHATE SCH MG: 4 INJECTION, SOLUTION INTRAMUSCULAR; INTRAVENOUS at 22:08

## 2019-09-07 RX ADMIN — INSULIN LISPRO SCH: 100 INJECTION, SOLUTION INTRAVENOUS; SUBCUTANEOUS at 16:49

## 2019-09-07 RX ADMIN — DEXAMETHASONE SODIUM PHOSPHATE SCH MG: 4 INJECTION, SOLUTION INTRAMUSCULAR; INTRAVENOUS at 03:48

## 2019-09-07 RX ADMIN — LEVETIRACETAM SCH MLS/HR: 5 INJECTION INTRAVENOUS at 22:08

## 2019-09-07 NOTE — PDOC PROGRESS REPORT
Subjective


Progress Note for:: 09/07/19


Subjective:: 





Patient was not able to meaningfully contribute to his health history and status

at the time of my bedside visit. Close family friend reported that patient has 

not been able to attend his monthly men's breakfast meeting due to recent 

deterioration in his health. Patient remain DNI and DNR. Nursing staff reported 

non-sustained run of VT without demonstrable symptoms.


Reason For Visit: 


CARCINOID TUMOR,BRAIN METAST








Physical Exam


Vital Signs: 


                                        











Temp Pulse Resp BP Pulse Ox


 


 97.8 F   68   16   107/57 L  94 


 


 09/07/19 07:45  09/07/19 07:45  09/07/19 07:45  09/07/19 07:45  09/07/19 07:45








                                 Intake & Output











 09/06/19 09/07/19 09/08/19





 06:59 06:59 06:59


 


Intake Total  250 100


 


Balance  250 100


 


Weight  77.1 kg 











General appearance: PRESENT: no acute distress


Head exam: PRESENT: atraumatic, normocephalic


Eye exam: PRESENT: conjunctiva pink.  ABSENT: scleral icterus


Ear exam: PRESENT: normal external ear exam


Mouth exam: PRESENT: dry mucosa


Respiratory exam: PRESENT: decreased breath sounds


Cardiovascular exam: PRESENT: RRR.  ABSENT: diastolic murmur, rubs, systolic 

murmur


Vascular exam: ABSENT: pallor


GI/Abdominal exam: PRESENT: normal bowel sounds, soft.  ABSENT: tenderness


Extremities exam: PRESENT: pedal edema


Musculoskeletal exam: PRESENT: deformity - due to multiple joints involvement 

with arthritis


Neurological exam: PRESENT: altered - with right side neglect, oriented to 

person


Skin exam: PRESENT: dry, warm





Results


Laboratory Results: 


                                        





                                 09/07/19 07:36 





                                 09/07/19 06:18 





                                        











  09/06/19 09/06/19 09/06/19





  13:50 13:50 15:56


 


WBC  10.0  


 


RBC  4.04 L  


 


Hgb  12.3 L  


 


Hct  36.5 L  


 


MCV  90  


 


MCH  30.5  


 


MCHC  33.8  


 


RDW  14.6 H  


 


Plt Count  211  


 


Seg Neutrophils %  71.6  


 


Sodium   Cancelled 


 


Potassium   Cancelled 


 


Chloride   Cancelled 


 


Carbon Dioxide   Cancelled 


 


Anion Gap   Cancelled 


 


BUN   Cancelled 


 


Creatinine   Cancelled 


 


Est GFR ( Amer)   Cancelled 


 


Est GFR (Non-Af Amer)   Cancelled 


 


Glucose   Cancelled 


 


Calcium   Cancelled 


 


Magnesium   


 


Total Bilirubin   Cancelled 


 


AST   Cancelled 


 


Alkaline Phosphatase   Cancelled 


 


Total Protein   Cancelled 


 


Albumin   Cancelled 


 


Urine Color    DARK YELLOW


 


Urine Appearance    SLIGHTLY-CLOUDY


 


Urine pH    5.0


 


Ur Specific Gravity    1.025


 


Urine Protein    100 H


 


Urine Glucose (UA)    NEGATIVE


 


Urine Ketones    20 H


 


Urine Blood    NEGATIVE


 


Urine Nitrite    NEGATIVE


 


Ur Leukocyte Esterase    NEGATIVE


 


Urine WBC (Auto)    1


 


Urine RBC (Auto)    2














  09/06/19 09/06/19 09/07/19





  20:18 20:18 06:18


 


WBC   7.9  Cancelled


 


RBC   3.43 L  Cancelled


 


Hgb   10.6 L  Cancelled


 


Hct   30.9 L  Cancelled


 


MCV   90  Cancelled


 


MCH   30.8  Cancelled


 


MCHC   34.2  Cancelled


 


RDW   14.4 H  Cancelled


 


Plt Count   229  Cancelled


 


Seg Neutrophils %   87.6 H  Cancelled


 


Sodium  135.1 L  


 


Potassium  4.7  


 


Chloride  101  


 


Carbon Dioxide  25  


 


Anion Gap  9  


 


BUN  16  


 


Creatinine  1.14  


 


Est GFR ( Amer)  > 60  


 


Est GFR (Non-Af Amer)   


 


Glucose  113 H  


 


Calcium  10.1  


 


Magnesium  1.9  


 


Total Bilirubin  0.9  


 


AST  53  


 


Alkaline Phosphatase  63  


 


Total Protein  8.3 H  


 


Albumin  4.1  


 


Urine Color   


 


Urine Appearance   


 


Urine pH   


 


Ur Specific Gravity   


 


Urine Protein   


 


Urine Glucose (UA)   


 


Urine Ketones   


 


Urine Blood   


 


Urine Nitrite   


 


Ur Leukocyte Esterase   


 


Urine WBC (Auto)   


 


Urine RBC (Auto)   














  09/07/19 09/07/19





  06:18 07:36


 


WBC   8.0


 


RBC   3.61 L


 


Hgb   11.3 L


 


Hct   32.8 L


 


MCV   91


 


MCH   31.2


 


MCHC   34.4


 


RDW   14.6 H


 


Plt Count   213


 


Seg Neutrophils %   77.7


 


Sodium  137.1 


 


Potassium  4.9 


 


Chloride  101 


 


Carbon Dioxide  23 


 


Anion Gap  13 


 


BUN  22 H 


 


Creatinine  1.40 H 


 


Est GFR ( Amer)  58 L 


 


Est GFR (Non-Af Amer)  


 


Glucose  126 H 


 


Calcium  10.2 


 


Magnesium  


 


Total Bilirubin  


 


AST  


 


Alkaline Phosphatase  


 


Total Protein  


 


Albumin  


 


Urine Color  


 


Urine Appearance  


 


Urine pH  


 


Ur Specific Gravity  


 


Urine Protein  


 


Urine Glucose (UA)  


 


Urine Ketones  


 


Urine Blood  


 


Urine Nitrite  


 


Ur Leukocyte Esterase  


 


Urine WBC (Auto)  


 


Urine RBC (Auto)  








                                        











  09/06/19 09/06/19





  20:18 20:18


 


Creatine Kinase  331 H 


 


CK-MB (CK-2)   4.19


 


Troponin I   0.028











Impressions: 


                                        





Chest X-Ray  09/06/19 14:46


IMPRESSION:  Right hilar and upper mediastinal adenopathy.


 








Head CT  09/06/19 14:46


IMPRESSION:  2 areas of high attenuation as described most consistent with 

hemorrhagic metastases.


Diffuse cerebral atrophy and small-vessel ischemic changes.


EVIDENCE OF ACUTE STROKE: NO.


 














Assessment & Plan





- Diagnosis


(1) Weakness


Is this a current diagnosis for this admission?: Yes   


Plan: 


Continue IV fluid support.








(2) Small cell carcinoma of lung


Is this a current diagnosis for this admission?: Yes   


Plan: 


Maintain on DNI and DNR status. Discussed possibility of in patient facility 

hospice placement for further palliative care.








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


Is this a current diagnosis for this admission?: Yes   


Plan: 


Maintain on DNI and DNR status. Discussed possibility of in patient facility 

hospice placement for further palliative care.








- Time


Time Spent with patient: 25-34 minutes


Medications reviewed and adjusted accordingly: Yes


Anticipated discharge: Hospice


Within: Other





- Inpatient Certification


Based on my medical assessment, after consideration of the patient's 

comorbidities, presenting symptoms, or acuity I expect that the services needed 

warrant INPATIENT care.: Yes


I certify that my determination is in accordance with my understanding of 

Medicare's requirements for reasonable and necessary INPATIENT services [42 CFR 

412.3e].: Yes


Medical Necessity: Significant Comorbidiites Make Outpatient Treatment Too 

Risky, Need Close Monitoring Due to Risk of Patient Decompensation, Need For IV 

Fluids, Risk of Complication if Not Cared For in Hospital, Risk of Diagnosis 

Which Will Require Inpatient Eval/Care/Monitoring


Post Hospital Care: D/C Planner Documentation





- Plan Summary


Plan Summary: 


Continue current management with plan for hospice placement.

## 2019-09-07 NOTE — PDOC CONSULTATION
Consultation


Consult Date: 09/07/19


Attending physician:: MARK DAVIS


Provider Consulted: JOHNNIE SHELDON


Consult reason:: Patient with known history of stage IV small cell lung cancer 

here with new brain metastasis





History of Present Illness


Admission Date/PCP: 


  09/06/19 16:11





  MARK DAVIS MD





Patient complains of: Confusion, weakness


History of Present Illness: 


DIANA GALLAGHER is a 88 year old male with previous history noted about 1 year a

go with a lung lesion, biopsy-proven to be low-grade carcinoid, and we were 

treating him with octreotide, about 3 months ago he began having new right neck 

adenopathy, this was biopsied and this indicated high-grade carcinoid favor 

small cell lung cancer, and at that time given his age and comorbidities the 

decision was made to hold on consideration of therapy.  We had offered hospice 

several times over the last couple months but patient was ambulatory and eating 

and did not feel ready for consideration of hospice.  Over the last 2 to 3 days 

prior to admission he was confused, lethargic and wife brought him in.  His wife

is 87 years old, but she has been doing pretty well and is quite ambulatory and 

does all of her ADLs and IADLs by herself.  We see her also in our oncology 

office for anemia.  Upon presentation CT of the head was done and there was a 

1.9 cm lesion in the left parietal lobe with vasogenic edema along with a 2.5 cm

lesion in the cerebellum.  Both consistent with metastatic disease in the brain.








Past Medical History


Cardiac Medical History: Reports: Hyperlipidema, Hypertension


Malignancy Medical History: Reports: Lung Cancer


GI Medical History: Reports: Gastroesophageal Reflux Disease





Past Surgical History


Past Surgical History: Reports: Other - Bronchoscopy with biopsy 2018, biopsy of

right cervical lymph node 2019





Social History


Lives with: Family


Smoking Status: Former Smoker





- Advance Directive


Resuscitation Status: Do Not Resuscitate





Family History


Family History: Reviewed & Not Pertinent


Parental Family History Reviewed: Yes


Children Family History Reviewed: Yes


Sibling(s) Family History Reviewed.: Yes





Medication/Allergy


Home Medications: 








Acetaminophen [Tylenol] 650 mg PO .ASDIR PRN 09/06/19 


Aspirin [Adult Low Dose Aspirin EC] 81 mg PO DAILY 09/06/19 


Benzonatate [Tessalon Perles 100 mg Capsule] 200 mg PO Q8HP PRN 09/06/19 


Cyanocobalamin (Vitamin B-12) [Vitamin B-12] 1,000 mcg PO DAILY 09/06/19 


Doxazosin Mesylate [Cardura] 8 mg PO DAILY 09/06/19 


Finasteride [Proscar 5 mg Tablet] 5 mg PO DAILY 09/06/19 


Hydrocodone/Acetaminophen [Norco 5-325 mg Tablet] 1 tab PO Q4HP PRN 09/06/19 


Pravastatin Sodium [Pravachol] 40 mg PO DAILY 09/06/19 


Ranitidine HCl [Zantac] 150 mg PO BID 09/06/19 


Sodium Bicarbonate [Sodium Bicarbonate 650 mg Tablet] 650 mg PO DAILY 09/06/19 








Allergies/Adverse Reactions: 


                                        





simvastatin [From Zocor] Allergy (Verified 09/06/19 15:40)


   











Review of Systems


ROS unobtainable: Due to mental status





Physical Exam


Vital Signs: 


                                        











Temp Pulse Resp BP Pulse Ox


 


 97.8 F   68   16   107/57 L  94 


 


 09/07/19 07:45  09/07/19 07:45  09/07/19 07:45  09/07/19 07:45  09/07/19 07:45








                                 Intake & Output











 09/06/19 09/07/19 09/08/19





 06:59 06:59 06:59


 


Intake Total  250 100


 


Balance  250 100


 


Weight  77.1 kg 











General appearance: PRESENT: hard of hearing


Mouth exam: PRESENT: dry mucosa


Neck exam: ABSENT: carotid bruit, JVD, lymphadenopathy, thyromegaly


Respiratory exam: PRESENT: clear to auscultation itzel.  ABSENT: rales, rhonchi, 

wheezes


Cardiovascular exam: PRESENT: RRR.  ABSENT: diastolic murmur, rubs, systolic 

murmur


GI/Abdominal exam: PRESENT: normal bowel sounds, soft.  ABSENT: distended, 

guarding, mass, organolmegaly, rebound, tenderness


Rectal exam: PRESENT: deferred


Neurological exam: PRESENT: altered





Results


Laboratory Results: 


                                        





                                 09/07/19 07:36 





                                 09/07/19 06:18 





                                        











  09/06/19 09/06/19 09/06/19





  13:50 13:50 15:56


 


WBC  10.0  


 


RBC  4.04 L  


 


Hgb  12.3 L  


 


Hct  36.5 L  


 


MCV  90  


 


MCH  30.5  


 


MCHC  33.8  


 


RDW  14.6 H  


 


Plt Count  211  


 


Seg Neutrophils %  71.6  


 


Sodium   Cancelled 


 


Potassium   Cancelled 


 


Chloride   Cancelled 


 


Carbon Dioxide   Cancelled 


 


Anion Gap   Cancelled 


 


BUN   Cancelled 


 


Creatinine   Cancelled 


 


Est GFR ( Amer)   Cancelled 


 


Est GFR (Non-Af Amer)   Cancelled 


 


Glucose   Cancelled 


 


Calcium   Cancelled 


 


Magnesium   


 


Total Bilirubin   Cancelled 


 


AST   Cancelled 


 


Alkaline Phosphatase   Cancelled 


 


Total Protein   Cancelled 


 


Albumin   Cancelled 


 


Urine Color    DARK YELLOW


 


Urine Appearance    SLIGHTLY-CLOUDY


 


Urine pH    5.0


 


Ur Specific Gravity    1.025


 


Urine Protein    100 H


 


Urine Glucose (UA)    NEGATIVE


 


Urine Ketones    20 H


 


Urine Blood    NEGATIVE


 


Urine Nitrite    NEGATIVE


 


Ur Leukocyte Esterase    NEGATIVE


 


Urine WBC (Auto)    1


 


Urine RBC (Auto)    2














  09/06/19 09/06/19 09/07/19





  20:18 20:18 06:18


 


WBC   7.9  Cancelled


 


RBC   3.43 L  Cancelled


 


Hgb   10.6 L  Cancelled


 


Hct   30.9 L  Cancelled


 


MCV   90  Cancelled


 


MCH   30.8  Cancelled


 


MCHC   34.2  Cancelled


 


RDW   14.4 H  Cancelled


 


Plt Count   229  Cancelled


 


Seg Neutrophils %   87.6 H  Cancelled


 


Sodium  135.1 L  


 


Potassium  4.7  


 


Chloride  101  


 


Carbon Dioxide  25  


 


Anion Gap  9  


 


BUN  16  


 


Creatinine  1.14  


 


Est GFR ( Amer)  > 60  


 


Est GFR (Non-Af Amer)   


 


Glucose  113 H  


 


Calcium  10.1  


 


Magnesium  1.9  


 


Total Bilirubin  0.9  


 


AST  53  


 


Alkaline Phosphatase  63  


 


Total Protein  8.3 H  


 


Albumin  4.1  


 


Urine Color   


 


Urine Appearance   


 


Urine pH   


 


Ur Specific Gravity   


 


Urine Protein   


 


Urine Glucose (UA)   


 


Urine Ketones   


 


Urine Blood   


 


Urine Nitrite   


 


Ur Leukocyte Esterase   


 


Urine WBC (Auto)   


 


Urine RBC (Auto)   














  09/07/19 09/07/19





  06:18 07:36


 


WBC   8.0


 


RBC   3.61 L


 


Hgb   11.3 L


 


Hct   32.8 L


 


MCV   91


 


MCH   31.2


 


MCHC   34.4


 


RDW   14.6 H


 


Plt Count   213


 


Seg Neutrophils %   77.7


 


Sodium  137.1 


 


Potassium  4.9 


 


Chloride  101 


 


Carbon Dioxide  23 


 


Anion Gap  13 


 


BUN  22 H 


 


Creatinine  1.40 H 


 


Est GFR ( Amer)  58 L 


 


Est GFR (Non-Af Amer)  


 


Glucose  126 H 


 


Calcium  10.2 


 


Magnesium  


 


Total Bilirubin  


 


AST  


 


Alkaline Phosphatase  


 


Total Protein  


 


Albumin  


 


Urine Color  


 


Urine Appearance  


 


Urine pH  


 


Ur Specific Gravity  


 


Urine Protein  


 


Urine Glucose (UA)  


 


Urine Ketones  


 


Urine Blood  


 


Urine Nitrite  


 


Ur Leukocyte Esterase  


 


Urine WBC (Auto)  


 


Urine RBC (Auto)  








                                        











  09/06/19 09/06/19





  20:18 20:18


 


Creatine Kinase  331 H 


 


CK-MB (CK-2)   4.19


 


Troponin I   0.028











Impressions: 


                                        





Chest X-Ray  09/06/19 14:46


IMPRESSION:  Right hilar and upper mediastinal adenopathy.


 








Head CT  09/06/19 14:46


IMPRESSION:  2 areas of high attenuation as described most consistent with 

hemorrhagic metastases.


Diffuse cerebral atrophy and small-vessel ischemic changes.


EVIDENCE OF ACUTE STROKE: NO.


 











Status: Image reviewed by me





Assessment & Plan





- Diagnosis


(1) Small cell carcinoma of lung


Is this a current diagnosis for this admission?: Yes   


Plan: 


Unfortunately with brain metastasis, not candidate for therapy.  I contacted 

Lucie from [facility] hospice who will be talking with his wife, Niki, to see

if home hospice is possible. 








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


Is this a current diagnosis for this admission?: Yes   


Plan: 


Continue with steroids for now, I do not believe patient is a candidate for even

whole brain radiation palliatively, would recommend hospice and I discussed this

with his wife who is agreeable for this.  We will have hospice meet with them 

today or tomorrow and then hopefully set up discharge if possible by Monday.








- Time


Time Spent: Greater than 70 Minutes





- Inpatient Certification


Based on my medical assessment, after consideration of the patient's 

comorbidities, presenting symptoms, or acuity I expect that the services needed 

warrant INPATIENT care.: Yes


Medical Necessity: Need for Neurological Checks, Risk of Complication if Not 

Cared For in Hospital

## 2019-09-08 LAB
ADD MANUAL DIFF: NO
ANION GAP SERPL CALC-SCNC: 10 MMOL/L (ref 5–19)
BASOPHILS # BLD AUTO: 0.1 10^3/UL (ref 0–0.2)
BASOPHILS NFR BLD AUTO: 0.7 % (ref 0–2)
BUN SERPL-MCNC: 33 MG/DL (ref 7–20)
CALCIUM: 9.7 MG/DL (ref 8.4–10.2)
CHLORIDE SERPL-SCNC: 101 MMOL/L (ref 98–107)
CO2 SERPL-SCNC: 25 MMOL/L (ref 22–30)
EOSINOPHIL # BLD AUTO: 0 10^3/UL (ref 0–0.6)
EOSINOPHIL NFR BLD AUTO: 0.1 % (ref 0–6)
ERYTHROCYTE [DISTWIDTH] IN BLOOD BY AUTOMATED COUNT: 14.4 % (ref 11.5–14)
GLUCOSE SERPL-MCNC: 120 MG/DL (ref 75–110)
HCT VFR BLD CALC: 32.1 % (ref 37.9–51)
HGB BLD-MCNC: 10.9 G/DL (ref 13.5–17)
LYMPHOCYTES # BLD AUTO: 1.1 10^3/UL (ref 0.5–4.7)
LYMPHOCYTES NFR BLD AUTO: 12 % (ref 13–45)
MCH RBC QN AUTO: 30.6 PG (ref 27–33.4)
MCHC RBC AUTO-ENTMCNC: 33.9 G/DL (ref 32–36)
MCV RBC AUTO: 90 FL (ref 80–97)
MONOCYTES # BLD AUTO: 0.4 10^3/UL (ref 0.1–1.4)
MONOCYTES NFR BLD AUTO: 3.8 % (ref 3–13)
NEUTROPHILS # BLD AUTO: 7.9 10^3/UL (ref 1.7–8.2)
NEUTS SEG NFR BLD AUTO: 83.4 % (ref 42–78)
PLATELET # BLD: 224 10^3/UL (ref 150–450)
POTASSIUM SERPL-SCNC: 4.8 MMOL/L (ref 3.6–5)
RBC # BLD AUTO: 3.55 10^6/UL (ref 4.35–5.55)
TOTAL CELLS COUNTED % (AUTO): 100 %
WBC # BLD AUTO: 9.5 10^3/UL (ref 4–10.5)

## 2019-09-08 RX ADMIN — INSULIN LISPRO SCH: 100 INJECTION, SOLUTION INTRAVENOUS; SUBCUTANEOUS at 21:46

## 2019-09-08 RX ADMIN — LEVETIRACETAM SCH MLS/HR: 5 INJECTION INTRAVENOUS at 09:21

## 2019-09-08 RX ADMIN — DEXAMETHASONE SODIUM PHOSPHATE SCH MG: 4 INJECTION, SOLUTION INTRAMUSCULAR; INTRAVENOUS at 03:52

## 2019-09-08 RX ADMIN — FAMOTIDINE SCH: 20 TABLET, FILM COATED ORAL at 09:28

## 2019-09-08 RX ADMIN — INSULIN LISPRO SCH: 100 INJECTION, SOLUTION INTRAVENOUS; SUBCUTANEOUS at 09:19

## 2019-09-08 RX ADMIN — DEXAMETHASONE SODIUM PHOSPHATE SCH MG: 4 INJECTION, SOLUTION INTRAMUSCULAR; INTRAVENOUS at 09:22

## 2019-09-08 RX ADMIN — DEXAMETHASONE SODIUM PHOSPHATE SCH MG: 4 INJECTION, SOLUTION INTRAMUSCULAR; INTRAVENOUS at 15:02

## 2019-09-08 RX ADMIN — INSULIN LISPRO SCH: 100 INJECTION, SOLUTION INTRAVENOUS; SUBCUTANEOUS at 12:22

## 2019-09-08 RX ADMIN — LEVETIRACETAM SCH MLS/HR: 5 INJECTION INTRAVENOUS at 21:22

## 2019-09-08 RX ADMIN — INSULIN LISPRO SCH: 100 INJECTION, SOLUTION INTRAVENOUS; SUBCUTANEOUS at 21:23

## 2019-09-08 RX ADMIN — FAMOTIDINE SCH: 20 TABLET, FILM COATED ORAL at 21:23

## 2019-09-08 RX ADMIN — DEXAMETHASONE SODIUM PHOSPHATE SCH MG: 4 INJECTION, SOLUTION INTRAMUSCULAR; INTRAVENOUS at 21:22

## 2019-09-08 NOTE — PDOC PROGRESS REPORT
Subjective


Progress Note for:: 09/08/19


Subjective:: 


Patient remain on DNI/DNR status and pain management support. He was [facility] by 

[facility] earlier today with arrangement for his discharge home 

tomorrow.


Reason For Visit: 


CARCINOID TUMOR,BRAIN METAST








Physical Exam


Vital Signs: 


                                        











Temp Pulse Resp BP Pulse Ox


 


 97.5 F   86   14   131/75 H  97 


 


 09/08/19 07:58  09/08/19 14:00  09/08/19 07:58  09/08/19 07:58  09/08/19 07:58








                                 Intake & Output











 09/07/19 09/08/19 09/09/19





 06:59 06:59 06:59


 


Intake Total 250 610 100


 


Balance 250 610 100


 


Weight 77.1 kg 71.1 kg 











Physical Exam: 


General appearance: PRESENT: no acute distress


Head exam: PRESENT: atraumatic, normocephalic


Eye exam: PRESENT: conjunctiva pink.  ABSENT: pallor, scleral icterus


Ear exam: PRESENT: normal external ear exam


Mouth exam: PRESENT: dry mucosa


Respiratory exam: PRESENT: decreased breath sounds


Cardiovascular exam: PRESENT: RRR.  ABSENT: diastolic murmur, rubs, systolic 

murmur


GI/Abdominal exam: PRESENT: normal bowel sounds, soft.  ABSENT: tenderness


Extremities exam: PRESENT: pedal edema


Musculoskeletal exam: PRESENT: deformity - due to multiple joints involvement 

with arthritis


Neurological exam: PRESENT: altered - with right side neglect, oriented to 

person


Skin exam: PRESENT: dry, warm








Results


Laboratory Results: 


                                        





                                 09/08/19 06:13 





                                 09/08/19 06:13 





                                        











  09/08/19 09/08/19 09/08/19





  05:27 06:13 06:13


 


WBC  Cancelled   9.5


 


RBC  Cancelled   3.55 L


 


Hgb  Cancelled   10.9 L


 


Hct  Cancelled   32.1 L


 


MCV  Cancelled   90


 


MCH  Cancelled   30.6


 


MCHC  Cancelled   33.9


 


RDW  Cancelled   14.4 H


 


Plt Count  Cancelled   224


 


Seg Neutrophils %  Cancelled   83.4 H


 


Sodium   135.5 L 


 


Potassium   4.8 


 


Chloride   101 


 


Carbon Dioxide   25 


 


Anion Gap   10 


 


BUN   33 H 


 


Creatinine   1.31 H 


 


Est GFR ( Amer)   > 60 


 


Glucose   120 H 


 


Calcium   9.7 








                                        











  09/06/19 09/06/19





  20:18 20:18


 


Creatine Kinase  331 H 


 


CK-MB (CK-2)   4.19


 


Troponin I   0.028











Impressions: 


                                        





Chest X-Ray  09/06/19 14:46


IMPRESSION:  Right hilar and upper mediastinal adenopathy.


 








Head CT  09/06/19 14:46


IMPRESSION:  2 areas of high attenuation as described most consistent with 

hemorrhagic metastases.


Diffuse cerebral atrophy and small-vessel ischemic changes.


EVIDENCE OF ACUTE STROKE: NO.


 














Assessment & Plan





- Diagnosis


(1) Weakness


Is this a current diagnosis for this admission?: Yes   





(2) Small cell carcinoma of lung


Is this a current diagnosis for this admission?: Yes   





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


Is this a current diagnosis for this admission?: Yes   





- Time


Time Spent with patient: 25-34 minutes


Medications reviewed and adjusted accordingly: Yes


Anticipated discharge: Hospice


Within: Other





- Inpatient Certification


Based on my medical assessment, after consideration of the patient's 

comorbidities, presenting symptoms, or acuity I expect that the services needed 

warrant INPATIENT care.: Yes


I certify that my determination is in accordance with my understanding of 

Medicare's requirements for reasonable and necessary INPATIENT services [42 CFR 

412.3e].: Yes


Medical Necessity: Significant Comorbidiites Make Outpatient Treatment Too 

Risky, Need Close Monitoring Due to Risk of Patient Decompensation, Need for 

Pain Control, Risk of Complication if Not Cared For in Hospital, Risk of 

Diagnosis Which Will Require Inpatient Eval/Care/Monitoring


Post Hospital Care: D/C Planner Documentation





- Plan Summary


Plan Summary: 





Continue supportive care with pain management. Follow up on efforts for home 

hospice care. Patient and spouse will need significant support for home hospice 

to be successful due to their advance age without in-home  social support 

network.

## 2019-09-08 NOTE — EKG REPORT
SEVERITY:- ABNORMAL ECG -

SINUS RHYTHM

LEFT ANTERIOR FASCICULAR BLOCK

CONSIDER ANTEROSEPTAL INFARCT

:

Confirmed by: Brittanie Boggs 08-Sep-2019 18:33:49

## 2019-09-09 VITALS — DIASTOLIC BLOOD PRESSURE: 60 MMHG | SYSTOLIC BLOOD PRESSURE: 105 MMHG

## 2019-09-09 LAB
ADD MANUAL DIFF: NO
ANION GAP SERPL CALC-SCNC: 9 MMOL/L (ref 5–19)
BASOPHILS # BLD AUTO: 0 10^3/UL (ref 0–0.2)
BASOPHILS NFR BLD AUTO: 0 % (ref 0–2)
BUN SERPL-MCNC: 29 MG/DL (ref 7–20)
CALCIUM: 9.8 MG/DL (ref 8.4–10.2)
CHLORIDE SERPL-SCNC: 101 MMOL/L (ref 98–107)
CO2 SERPL-SCNC: 25 MMOL/L (ref 22–30)
EOSINOPHIL # BLD AUTO: 0 10^3/UL (ref 0–0.6)
EOSINOPHIL NFR BLD AUTO: 0 % (ref 0–6)
ERYTHROCYTE [DISTWIDTH] IN BLOOD BY AUTOMATED COUNT: 14.8 % (ref 11.5–14)
GLUCOSE SERPL-MCNC: 117 MG/DL (ref 75–110)
HCT VFR BLD CALC: 34.1 % (ref 37.9–51)
HGB BLD-MCNC: 12 G/DL (ref 13.5–17)
LYMPHOCYTES # BLD AUTO: 0.7 10^3/UL (ref 0.5–4.7)
LYMPHOCYTES NFR BLD AUTO: 8.7 % (ref 13–45)
MCH RBC QN AUTO: 31.6 PG (ref 27–33.4)
MCHC RBC AUTO-ENTMCNC: 35 G/DL (ref 32–36)
MCV RBC AUTO: 90 FL (ref 80–97)
MONOCYTES # BLD AUTO: 0.5 10^3/UL (ref 0.1–1.4)
MONOCYTES NFR BLD AUTO: 6.3 % (ref 3–13)
NEUTROPHILS # BLD AUTO: 7.2 10^3/UL (ref 1.7–8.2)
NEUTS SEG NFR BLD AUTO: 85 % (ref 42–78)
PLATELET # BLD: 267 10^3/UL (ref 150–450)
POTASSIUM SERPL-SCNC: 4.6 MMOL/L (ref 3.6–5)
RBC # BLD AUTO: 3.78 10^6/UL (ref 4.35–5.55)
TOTAL CELLS COUNTED % (AUTO): 100 %
WBC # BLD AUTO: 8.4 10^3/UL (ref 4–10.5)

## 2019-09-09 RX ADMIN — INSULIN LISPRO SCH: 100 INJECTION, SOLUTION INTRAVENOUS; SUBCUTANEOUS at 15:25

## 2019-09-09 RX ADMIN — INSULIN LISPRO SCH: 100 INJECTION, SOLUTION INTRAVENOUS; SUBCUTANEOUS at 08:23

## 2019-09-09 RX ADMIN — DEXAMETHASONE SODIUM PHOSPHATE SCH MG: 4 INJECTION, SOLUTION INTRAMUSCULAR; INTRAVENOUS at 04:34

## 2019-09-09 RX ADMIN — DEXAMETHASONE SODIUM PHOSPHATE SCH MG: 4 INJECTION, SOLUTION INTRAMUSCULAR; INTRAVENOUS at 08:29

## 2019-09-09 RX ADMIN — LEVETIRACETAM SCH MLS/HR: 5 INJECTION INTRAVENOUS at 09:40

## 2019-09-09 RX ADMIN — FAMOTIDINE SCH MG: 20 TABLET, FILM COATED ORAL at 09:40

## 2019-09-09 RX ADMIN — DEXAMETHASONE SODIUM PHOSPHATE SCH MG: 4 INJECTION, SOLUTION INTRAMUSCULAR; INTRAVENOUS at 15:30

## 2019-09-09 NOTE — PDOC DISCHARGE SUMMARY
General





- Admit/Disc Date/PCP


Admission Date/Primary Care Provider: 


  09/06/19 16:11





  MARK DAVIS MD





Discharge Date: 09/09/19





- Additional Information


Resuscitation Status: Do Not Resuscitate


Discharge Diet: As Tolerated, Regular


Discharge Activity: Activity As Tolerated


Home Medications: 








Acetaminophen [Tylenol] 650 mg PO .ASDIR PRN 09/06/19 


Aspirin [Adult Low Dose Aspirin EC] 81 mg PO DAILY 09/06/19 


Benzonatate [Tessalon Perles 100 mg Capsule] 200 mg PO Q8HP PRN 09/06/19 


Cyanocobalamin (Vitamin B-12) [Vitamin B-12] 1,000 mcg PO DAILY 09/06/19 


Doxazosin Mesylate [Cardura] 8 mg PO DAILY 09/06/19 


Finasteride [Proscar 5 mg Tablet] 5 mg PO DAILY 09/06/19 


Hydrocodone/Acetaminophen [Norco 5-325 mg Tablet] 1 tab PO Q4HP PRN 09/06/19 


Pravastatin Sodium [Pravachol] 40 mg PO DAILY 09/06/19 


Ranitidine HCl [Zantac] 150 mg PO BID 09/06/19 


Sodium Bicarbonate [Sodium Bicarbonate 650 mg Tablet] 650 mg PO DAILY 09/06/19 











History of Present Illness


History of Present Illness: 


DIANA GALLAGHER is a 88 year old male


This is a 88-year-old male's with a history of the small cell carcinoma history 

of the carcinoid tumor presented the emergency department with altered mental 

status and patients diagnosed with the brain metastatic disease


Very extensive discussions with the oncology patient is not a candidate for any 

aggressive therapy and patient's already refused in the past discussed with the 

patient's wife and other family member and patient was put on hospice and 

comfort care





Hospital Course


Hospital Course: 





This 88-year-old male's presented to the emergency department altered mental 

status and patient CT of the head was consistent with the metastatic disease 

coming from the small cell carcinoma with advanced age advanced disease patient 

is already refused further treatment in the past oncology was consulted and 

suggest the mostly hospice comfort care and discussed with the family and 

patient's discharge home with the hospice





Physical Exam


Vital Signs: 


                                        











Temp Pulse Resp BP Pulse Ox


 


 97.9 F   79   16   143/89 H  100 


 


 09/09/19 07:54  09/09/19 07:54  09/09/19 07:54  09/09/19 07:54  09/09/19 07:54








                                 Intake & Output











 09/08/19 09/09/19 09/10/19





 06:59 06:59 06:59


 


Intake Total 610 368 


 


Balance 610 368 


 


Weight 71.1 kg 74.2 kg 











General appearance: PRESENT: no acute distress, well-developed, well-nourished


Head exam: PRESENT: atraumatic, normocephalic


Eye exam: PRESENT: conjunctiva pink, EOMI, PERRLA.  ABSENT: scleral icterus


Ear exam: PRESENT: normal external ear exam


Mouth exam: PRESENT: moist, tongue midline


Neck exam: PRESENT: full ROM.  ABSENT: carotid bruit, JVD, lymphadenopathy, 

thyromegaly


Respiratory exam: PRESENT: clear to auscultation itzel


Cardiovascular exam: PRESENT: RRR.  ABSENT: diastolic murmur, rubs, systolic 

murmur


Pulses: PRESENT: normal dorsalis pedis pul, +2 pedal pulses bilateral


Vascular exam: PRESENT: normal capillary refill


GI/Abdominal exam: PRESENT: normal bowel sounds, soft.  ABSENT: distended, 

guarding, mass, organolmegaly, rebound, tenderness


Rectal exam: PRESENT: deferred


Neurological exam: PRESENT: alert, awake, oriented to person.  ABSENT: motor 

sensory deficit


Psychiatric exam: PRESENT: appropriate affect, normal mood.  ABSENT: homicidal 

ideation, suicidal ideation


Skin exam: PRESENT: dry, intact, warm.  ABSENT: cyanosis, rash





Results


Laboratory Results: 


                                        





                                 09/09/19 06:57 





                                 09/09/19 06:57 





                                        











  09/09/19 09/09/19





  06:57 06:57


 


WBC  8.4 


 


RBC  3.78 L 


 


Hgb  12.0 L 


 


Hct  34.1 L 


 


MCV  90 


 


MCH  31.6 


 


MCHC  35.0 


 


RDW  14.8 H 


 


Plt Count  267 


 


Seg Neutrophils %  85.0 H 


 


Sodium   135.1 L


 


Potassium   4.6


 


Chloride   101


 


Carbon Dioxide   25


 


Anion Gap   9


 


BUN   29 H


 


Creatinine   1.21


 


Est GFR (African Amer)   > 60


 


Glucose   117 H


 


Calcium   9.8








                                        











  09/06/19 09/06/19





  20:18 20:18


 


Creatine Kinase  331 H 


 


CK-MB (CK-2)   4.19


 


Troponin I   0.028











Impressions: 


                                        





Chest X-Ray  09/06/19 14:46


IMPRESSION:  Right hilar and upper mediastinal adenopathy.


 








Head CT  09/06/19 14:46


IMPRESSION:  2 areas of high attenuation as described most consistent with 

hemorrhagic metastases.


Diffuse cerebral atrophy and small-vessel ischemic changes.


EVIDENCE OF ACUTE STROKE: NO.


 














Qualifiers





- *


PATIENT BEING DISCHARGED WITH ANY OF THE FOLLOWING DIAGNOSIS: No





Acute Heart Failure





- **


Is this a Heart Failure Patient?: No





Plan


Time Spent: Greater than 30 Minutes - Patient is discharged with the hospice 

care due to the small cell carcinoma with the brain metastatic disease with a 

life expectancy is less than 6-month

## 2019-09-09 NOTE — PDOC PROGRESS REPORT
Subjective


Progress Note for:: 09/09/19


Subjective:: 


D/C home planned today w/ hospice, pt poorly responsive





Reason For Visit: 


CARCINOID TUMOR,BRAIN METAST








Physical Exam


Vital Signs: 


                                        











Temp Pulse Resp BP Pulse Ox


 


 97.9 F   79   16   143/89 H  100 


 


 09/09/19 07:54  09/09/19 07:54  09/09/19 07:54  09/09/19 07:54  09/09/19 07:54








                                 Intake & Output











 09/08/19 09/09/19 09/10/19





 06:59 06:59 06:59


 


Intake Total 610 318 


 


Balance 610 318 


 


Weight 71.1 kg  











General appearance: PRESENT: hard of hearing, thin


Mouth exam: PRESENT: dry mucosa


Neck exam: ABSENT: carotid bruit, JVD, lymphadenopathy, thyromegaly


Respiratory exam: PRESENT: clear to auscultation itzel.  ABSENT: rales, rhonchi, 

wheezes


Cardiovascular exam: PRESENT: RRR.  ABSENT: diastolic murmur, rubs, systolic 

murmur


GI/Abdominal exam: PRESENT: normal bowel sounds, soft.  ABSENT: distended, 

guarding, mass, organolmegaly, rebound, tenderness


Rectal exam: PRESENT: deferred


Neurological exam: PRESENT: altered





Results


Laboratory Results: 


                                        





                                 09/09/19 06:57 





                                        











  09/09/19





  06:57


 


WBC  8.4


 


RBC  3.78 L


 


Hgb  12.0 L


 


Hct  34.1 L


 


MCV  90


 


MCH  31.6


 


MCHC  35.0


 


RDW  14.8 H


 


Plt Count  267


 


Seg Neutrophils %  85.0 H








                                        











  09/06/19 09/06/19





  20:18 20:18


 


Creatine Kinase  331 H 


 


CK-MB (CK-2)   4.19


 


Troponin I   0.028











Impressions: 


                                        





Chest X-Ray  09/06/19 14:46


IMPRESSION:  Right hilar and upper mediastinal adenopathy.


 








Head CT  09/06/19 14:46


IMPRESSION:  2 areas of high attenuation as described most consistent with 

hemorrhagic metastases.


Diffuse cerebral atrophy and small-vessel ischemic changes.


EVIDENCE OF ACUTE STROKE: NO.


 














Assessment & Plan





- Diagnosis


(1) Small cell carcinoma of lung


Is this a current diagnosis for this admission?: Yes   


Plan: 


No acute events overnight, no further chemorx planned, home hospice








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


Is this a current diagnosis for this admission?: Yes   


Plan: 


No further chemo rx